# Patient Record
Sex: MALE | Race: WHITE | NOT HISPANIC OR LATINO | URBAN - METROPOLITAN AREA
[De-identification: names, ages, dates, MRNs, and addresses within clinical notes are randomized per-mention and may not be internally consistent; named-entity substitution may affect disease eponyms.]

---

## 2018-03-01 NOTE — PATIENT DISCUSSION
DIPLOPIA:  PATIENT REPORTS THAT SHE ONLY SEES DOUBLE ONCE IN A BLUE MOON AND THAT WE SHOULDN'T WORRY ABOUT IT.

## 2018-03-01 NOTE — PATIENT DISCUSSION
Updated glasses RX given. Explained to her that it is likely the intermittent double vision she experiences will persist because it is a nerve issue and not a glasses issue, therefore prisms will not help her. Advised that she return to Dr. Waylon Castleman for further treatment options.

## 2022-11-09 ENCOUNTER — COMPREHENSIVE EXAM (OUTPATIENT)
Dept: URBAN - METROPOLITAN AREA CLINIC 16 | Facility: CLINIC | Age: 47
End: 2022-11-09

## 2022-11-09 DIAGNOSIS — H52.203: ICD-10-CM

## 2022-11-09 DIAGNOSIS — H10.503: ICD-10-CM

## 2022-11-09 DIAGNOSIS — H52.4: ICD-10-CM

## 2022-11-09 DIAGNOSIS — Z01.00: ICD-10-CM

## 2022-11-09 DIAGNOSIS — H52.01: ICD-10-CM

## 2022-11-09 PROCEDURE — 92014 COMPRE OPH EXAM EST PT 1/>: CPT

## 2022-11-09 PROCEDURE — 92015 DETERMINE REFRACTIVE STATE: CPT

## 2022-11-09 ASSESSMENT — KERATOMETRY
OS_AXISANGLE_DEGREES: 163
OD_AXISANGLE2_DEGREES: 99
OD_K2POWER_DIOPTERS: 44.00
OS_K1POWER_DIOPTERS: 43.00
OD_K1POWER_DIOPTERS: 43.50
OS_K2POWER_DIOPTERS: 43.75
OD_AXISANGLE_DEGREES: 9
OS_AXISANGLE2_DEGREES: 73

## 2022-11-09 ASSESSMENT — TONOMETRY
OD_IOP_MMHG: 15
OS_IOP_MMHG: 13

## 2022-11-09 ASSESSMENT — VISUAL ACUITY
OS_SC: 20/20
OU_SC: 20/15-1
OD_SC: 20/20